# Patient Record
Sex: MALE | Race: WHITE | NOT HISPANIC OR LATINO | URBAN - METROPOLITAN AREA
[De-identification: names, ages, dates, MRNs, and addresses within clinical notes are randomized per-mention and may not be internally consistent; named-entity substitution may affect disease eponyms.]

---

## 2022-11-24 ENCOUNTER — EMERGENCY (EMERGENCY)
Facility: HOSPITAL | Age: 37
LOS: 1 days | Discharge: HOME | End: 2022-11-24
Attending: EMERGENCY MEDICINE | Admitting: EMERGENCY MEDICINE

## 2022-11-24 VITALS
RESPIRATION RATE: 16 BRPM | HEART RATE: 88 BPM | TEMPERATURE: 98 F | DIASTOLIC BLOOD PRESSURE: 74 MMHG | OXYGEN SATURATION: 100 % | SYSTOLIC BLOOD PRESSURE: 142 MMHG

## 2022-11-24 PROCEDURE — 99283 EMERGENCY DEPT VISIT LOW MDM: CPT

## 2022-11-24 RX ORDER — PERMETHRIN CREAM 5% W/W 50 MG/G
1 CREAM TOPICAL ONCE
Refills: 0 | Status: COMPLETED | OUTPATIENT
Start: 2022-11-24 | End: 2022-11-24

## 2022-11-24 NOTE — ED ADULT TRIAGE NOTE - CHIEF COMPLAINT QUOTE
Pt found in the hallway of an apartment building coming in for intox. someone who lives there called 911 and stated the person doesn't live there. Pt slurring his words. No complaints of chest pain, headache, nausea, dizziness, vomiting  SOB, fever, chills verbalized.

## 2022-11-24 NOTE — ED PROVIDER NOTE - ATTENDING CONTRIBUTION TO CARE
DR. PINON, ATTENDING MD-  I performed a face to face bedside interview with the patient regarding history of present illness, review of symptoms and past medical history. I completed an independent physical exam.  I have discussed the patient's plan of care with the resident.   Documentation as above in the note.    36 y/o male etoh intox.  No head trauma.  Obtain fs glucose, reassess for sobriety.

## 2022-11-24 NOTE — ED PROVIDER NOTE - OBJECTIVE STATEMENT
37M unknown PMH BIBEMS for intoxication, pt found sleeping in apartment building that he does not live in. Pt noted to have slurred speech, appeared intoxicated. Reports chronic itchy rash. Denying pain, fall, head trauma, neck pain. Reports alcohol use, denies drug use.

## 2022-11-24 NOTE — ED ADULT NURSE REASSESSMENT NOTE - NS ED NURSE REASSESS COMMENT FT1
Bug found crawling on patient behind his ear and collected.  MD to assess and then patient will be decontaminated.

## 2022-11-24 NOTE — ED PROVIDER NOTE - PROGRESS NOTE DETAILS
Maryellen Benedict DO (PGY2): Bug noted during exam, will place pt on contact precaution and will decon patient. Maryellen Benedict DO (PGY2): Pt s/p decon. Pt awake, tolerating PO and ambulating in the ED. Pt AAOx3. States he drank alcohol last night, no other substance use. Denies falling, head trauma. Denies chest pain, headache, neck pain, n/v/d, abdominal pain. No signs of alcohol withdrawal on reassessment. Pt would like to be dc'd from the emergency department. He states he uses shelters in the area. Questions regarding their symptoms were addressed. Given strict return precautions. Pt verbalized understanding.

## 2022-11-24 NOTE — ED PROVIDER NOTE - CLINICAL SUMMARY MEDICAL DECISION MAKING FREE TEXT BOX
37M unknown PMH BIBEMS for intoxication, pt found sleeping in apartment building that he does not live in. Given hx and physical, ddx includes but is not limited to alcohol intoxication, heroin abuse, scabies, hypoglycemia. Low concern for traumatic injury given exam, pt following commands, nonfocal neuro exam, no signs of trauma. Low concern for opioid intox given dilated pupils and normal respirations. Will obtain FSG and XR arms to eval for needle foreign body and re-evaluate pt once clinically sober. 37M unknown PMH BIBEMS for intoxication, pt found sleeping in apartment building that he does not live in. Given hx and physical, ddx includes but is not limited to alcohol intoxication, heroin abuse, scabies, bed bugs, lice, hypoglycemia. Low concern for traumatic injury given exam, pt following commands, nonfocal neuro exam, no signs of trauma. Low concern for opioid intox given dilated pupils and normal respirations. Will obtain FSG and XR arms to eval for needle foreign body and re-evaluate pt once clinically sober.

## 2022-11-24 NOTE — ED PROVIDER NOTE - NSFOLLOWUPINSTRUCTIONS_ED_ALL_ED_FT
Alcohol Use     Alcohol intoxication occurs when the amount of alcohol that a person has consumed impairs his or her ability to mentally and physically function. Chronic alcohol consumption can also lead to a variety of health issues including neurological disease, stomach disease, heart disease, liver disease, etc. Do not drive after drinking alcohol. Drinking enough alcohol to end up in an Emergency Room suggests you may have an alcohol abuse problem. Seek help at a drug addiction center.    SEEK IMMEDIATE MEDICAL CARE IF YOU HAVE ANY OF THE FOLLOWING SYMPTOMS: seizures, vomiting blood, blood in your stool, lightheadedness/dizziness, or becoming shaky to tremulous when you stop drinking.

## 2022-11-24 NOTE — ED PROVIDER NOTE - PHYSICAL EXAMINATION
GENERAL: Awake. Alert. NAD. Well nourished.  HEENT: NC/AT, Pupils 6mm b/l, PERRL, EOMI, Conjunctiva pink, no scleral icterus. Airway patent. Moist mucous membranes.  LUNGS: CTAB. No wheezes or rales noted.  CARDIAC: Chest non-tender to palpation. RRR.  ABDOMEN: No masses noted. Soft, NT, ND, no rebound, no guarding.  BACK: No midline spinal tenderness  EXT: No edema, no calf tenderness, distal pulses 2+ bilaterally  NEURO: A&Ox2. Following commands. Moving all extremities. Sensation and strength intact throughout.   SKIN: Warm and dry. Track marks, erythematous macular scaling rash.  PSYCH: Normal affect.

## 2022-11-24 NOTE — ED ADULT NURSE NOTE - NSIMPLEMENTINTERV_GEN_ALL_ED
Implemented All Fall with Harm Risk Interventions:  [place] to call system. Call bell, personal items and telephone within reach. Instruct patient to call for assistance. Room bathroom lighting operational. Non-slip footwear when patient is off stretcher. Physically safe environment: no spills, clutter or unnecessary equipment. Stretcher in lowest position, wheels locked, appropriate side rails in place. Provide visual cue, wrist band, yellow gown, etc. Monitor gait and stability. Monitor for mental status changes and reorient to person, place, and time. Review medications for side effects contributing to fall risk. Reinforce activity limits and safety measures with patient and family. Provide visual clues: red socks.

## 2022-11-24 NOTE — ED ADULT NURSE NOTE - OBJECTIVE STATEMENT
Pt received to room 17, intoxicated. Pt brought in by EMS after being found sleeping in hallway of apartment building when residents called 911 after discovering him. Pt appears unkempt. Skin on b/l upper extremities appears irritated, diffusely excoriated with redness and scabbing. Pt unable to provide complete medical history at this time. MD centeno still in progress. Awaiting orders. Safety maintained. Continue to monitor.

## 2022-11-24 NOTE — ED PROVIDER NOTE - PATIENT PORTAL LINK FT
You can access the FollowMyHealth Patient Portal offered by [facility] by registering at the following website: [email or web address]. By joining [facility]’s FollowMyHealth portal, you will also be able to view your health information using other applications (apps) compatible with our system.

## 2022-11-25 VITALS
DIASTOLIC BLOOD PRESSURE: 75 MMHG | SYSTOLIC BLOOD PRESSURE: 122 MMHG | OXYGEN SATURATION: 100 % | TEMPERATURE: 98 F | HEART RATE: 75 BPM | RESPIRATION RATE: 16 BRPM

## 2022-11-25 PROCEDURE — 73060 X-RAY EXAM OF HUMERUS: CPT | Mod: 26,50

## 2022-11-25 PROCEDURE — 73090 X-RAY EXAM OF FOREARM: CPT | Mod: 26,50

## 2022-11-25 RX ADMIN — PERMETHRIN CREAM 5% W/W 1 APPLICATION(S): 50 CREAM TOPICAL at 00:49
